# Patient Record
Sex: MALE | Race: OTHER | NOT HISPANIC OR LATINO | URBAN - METROPOLITAN AREA
[De-identification: names, ages, dates, MRNs, and addresses within clinical notes are randomized per-mention and may not be internally consistent; named-entity substitution may affect disease eponyms.]

---

## 2017-02-01 ENCOUNTER — EMERGENCY (EMERGENCY)
Facility: HOSPITAL | Age: 42
LOS: 1 days | Discharge: HOME | End: 2017-02-01
Attending: EMERGENCY MEDICINE
Payer: COMMERCIAL

## 2017-02-01 VITALS
HEART RATE: 72 BPM | TEMPERATURE: 97 F | OXYGEN SATURATION: 98 % | SYSTOLIC BLOOD PRESSURE: 118 MMHG | RESPIRATION RATE: 16 BRPM | DIASTOLIC BLOOD PRESSURE: 65 MMHG

## 2017-02-01 VITALS
TEMPERATURE: 99 F | HEART RATE: 82 BPM | SYSTOLIC BLOOD PRESSURE: 143 MMHG | HEIGHT: 68 IN | RESPIRATION RATE: 18 BRPM | DIASTOLIC BLOOD PRESSURE: 79 MMHG | WEIGHT: 184.97 LBS | OXYGEN SATURATION: 97 %

## 2017-02-01 DIAGNOSIS — R00.1 BRADYCARDIA, UNSPECIFIED: ICD-10-CM

## 2017-02-01 DIAGNOSIS — R23.3 SPONTANEOUS ECCHYMOSES: ICD-10-CM

## 2017-02-01 DIAGNOSIS — T50.901A POISONING BY UNSPECIFIED DRUGS, MEDICAMENTS AND BIOLOGICAL SUBSTANCES, ACCIDENTAL (UNINTENTIONAL), INITIAL ENCOUNTER: ICD-10-CM

## 2017-02-01 LAB
ALBUMIN SERPL ELPH-MCNC: 4 G/DL — SIGNIFICANT CHANGE UP (ref 3.3–5.2)
ALP SERPL-CCNC: 75 U/L — SIGNIFICANT CHANGE UP (ref 40–120)
ALT FLD-CCNC: 19 U/L — SIGNIFICANT CHANGE UP
AMPHET UR-MCNC: NEGATIVE — SIGNIFICANT CHANGE UP
ANION GAP SERPL CALC-SCNC: 11 MMOL/L — SIGNIFICANT CHANGE UP (ref 5–17)
APPEARANCE UR: CLEAR — SIGNIFICANT CHANGE UP
AST SERPL-CCNC: 18 U/L — SIGNIFICANT CHANGE UP
BACTERIA # UR AUTO: ABNORMAL
BARBITURATES UR SCN-MCNC: NEGATIVE — SIGNIFICANT CHANGE UP
BASOPHILS # BLD AUTO: 0 K/UL — SIGNIFICANT CHANGE UP (ref 0–0.2)
BASOPHILS NFR BLD AUTO: 0.2 % — SIGNIFICANT CHANGE UP (ref 0–2)
BENZODIAZ UR-MCNC: POSITIVE
BILIRUB SERPL-MCNC: 0.2 MG/DL — LOW (ref 0.4–2)
BILIRUB UR-MCNC: NEGATIVE — SIGNIFICANT CHANGE UP
BUN SERPL-MCNC: 12 MG/DL — SIGNIFICANT CHANGE UP (ref 8–20)
CALCIUM SERPL-MCNC: 9.1 MG/DL — SIGNIFICANT CHANGE UP (ref 8.6–10.2)
CHLORIDE SERPL-SCNC: 102 MMOL/L — SIGNIFICANT CHANGE UP (ref 98–107)
CO2 SERPL-SCNC: 30 MMOL/L — HIGH (ref 22–29)
COCAINE METAB.OTHER UR-MCNC: POSITIVE
COLOR SPEC: YELLOW — SIGNIFICANT CHANGE UP
CREAT SERPL-MCNC: 0.63 MG/DL — SIGNIFICANT CHANGE UP (ref 0.5–1.3)
DIFF PNL FLD: ABNORMAL
EOSINOPHIL # BLD AUTO: 0.1 K/UL — SIGNIFICANT CHANGE UP (ref 0–0.5)
EOSINOPHIL NFR BLD AUTO: 0.5 % — SIGNIFICANT CHANGE UP (ref 0–5)
EPI CELLS # UR: SIGNIFICANT CHANGE UP
GLUCOSE SERPL-MCNC: 136 MG/DL — HIGH (ref 70–115)
GLUCOSE UR QL: NEGATIVE MG/DL — SIGNIFICANT CHANGE UP
HCT VFR BLD CALC: 44.1 % — SIGNIFICANT CHANGE UP (ref 42–52)
HGB BLD-MCNC: 14.1 G/DL — SIGNIFICANT CHANGE UP (ref 14–18)
KETONES UR-MCNC: NEGATIVE — SIGNIFICANT CHANGE UP
LEUKOCYTE ESTERASE UR-ACNC: NEGATIVE — SIGNIFICANT CHANGE UP
LYMPHOCYTES # BLD AUTO: 1 K/UL — SIGNIFICANT CHANGE UP (ref 1–4.8)
LYMPHOCYTES # BLD AUTO: 8.8 % — LOW (ref 20–55)
MAGNESIUM SERPL-MCNC: 1.9 MG/DL — SIGNIFICANT CHANGE UP (ref 1.8–2.5)
MCHC RBC-ENTMCNC: 27.8 PG — SIGNIFICANT CHANGE UP (ref 27–31)
MCHC RBC-ENTMCNC: 32 G/DL — SIGNIFICANT CHANGE UP (ref 32–36)
MCV RBC AUTO: 86.8 FL — SIGNIFICANT CHANGE UP (ref 80–94)
METHADONE UR-MCNC: NEGATIVE — SIGNIFICANT CHANGE UP
MONOCYTES # BLD AUTO: 0.8 K/UL — SIGNIFICANT CHANGE UP (ref 0–0.8)
MONOCYTES NFR BLD AUTO: 7.8 % — SIGNIFICANT CHANGE UP (ref 3–10)
NEUTROPHILS # BLD AUTO: 9 K/UL — HIGH (ref 1.8–8)
NEUTROPHILS NFR BLD AUTO: 82.3 % — HIGH (ref 37–73)
NITRITE UR-MCNC: NEGATIVE — SIGNIFICANT CHANGE UP
OPIATES UR-MCNC: POSITIVE
PCP SPEC-MCNC: SIGNIFICANT CHANGE UP
PCP UR-MCNC: NEGATIVE — SIGNIFICANT CHANGE UP
PH UR: 6 — SIGNIFICANT CHANGE UP (ref 4.8–8)
PHOSPHATE SERPL-MCNC: 2.4 MG/DL — SIGNIFICANT CHANGE UP (ref 2.4–4.7)
PLATELET # BLD AUTO: 229 K/UL — SIGNIFICANT CHANGE UP (ref 150–400)
POTASSIUM SERPL-MCNC: 4.6 MMOL/L — SIGNIFICANT CHANGE UP (ref 3.5–5.3)
POTASSIUM SERPL-SCNC: 4.6 MMOL/L — SIGNIFICANT CHANGE UP (ref 3.5–5.3)
PROT SERPL-MCNC: 7.4 G/DL — SIGNIFICANT CHANGE UP (ref 6.6–8.7)
PROT UR-MCNC: 30 MG/DL
RBC # BLD: 5.08 M/UL — SIGNIFICANT CHANGE UP (ref 4.6–6.2)
RBC # FLD: 14.5 % — SIGNIFICANT CHANGE UP (ref 11–15.6)
RBC CASTS # UR COMP ASSIST: ABNORMAL /HPF (ref 0–4)
SODIUM SERPL-SCNC: 143 MMOL/L — SIGNIFICANT CHANGE UP (ref 135–145)
SP GR SPEC: 1.02 — SIGNIFICANT CHANGE UP (ref 1.01–1.02)
THC UR QL: POSITIVE
TSH SERPL-MCNC: 0.33 UIU/ML — SIGNIFICANT CHANGE UP (ref 0.27–4.2)
UROBILINOGEN FLD QL: NEGATIVE MG/DL — SIGNIFICANT CHANGE UP
WBC # BLD: 10.94 K/UL — HIGH (ref 4.8–10.8)
WBC # FLD AUTO: 10.94 K/UL — HIGH (ref 4.8–10.8)
WBC UR QL: SIGNIFICANT CHANGE UP

## 2017-02-01 PROCEDURE — 85027 COMPLETE CBC AUTOMATED: CPT

## 2017-02-01 PROCEDURE — 84100 ASSAY OF PHOSPHORUS: CPT

## 2017-02-01 PROCEDURE — 84443 ASSAY THYROID STIM HORMONE: CPT

## 2017-02-01 PROCEDURE — 70450 CT HEAD/BRAIN W/O DYE: CPT

## 2017-02-01 PROCEDURE — 70450 CT HEAD/BRAIN W/O DYE: CPT | Mod: 26

## 2017-02-01 PROCEDURE — 83735 ASSAY OF MAGNESIUM: CPT

## 2017-02-01 PROCEDURE — 99284 EMERGENCY DEPT VISIT MOD MDM: CPT | Mod: 25

## 2017-02-01 PROCEDURE — 93010 ELECTROCARDIOGRAM REPORT: CPT

## 2017-02-01 PROCEDURE — 81001 URINALYSIS AUTO W/SCOPE: CPT

## 2017-02-01 PROCEDURE — 99053 MED SERV 10PM-8AM 24 HR FAC: CPT

## 2017-02-01 PROCEDURE — 80307 DRUG TEST PRSMV CHEM ANLYZR: CPT

## 2017-02-01 PROCEDURE — 93005 ELECTROCARDIOGRAM TRACING: CPT

## 2017-02-01 PROCEDURE — 80053 COMPREHEN METABOLIC PANEL: CPT

## 2017-02-01 NOTE — SBIRT NOTE. - NSSBIRTSERVICES_GEN_A_ED_FT
Provided SBIRT services: Full screen positive. Referral to Treatment Performed. Screening results were reviewed with the patient and patient was provided information about healthy guidelines and potential negative consequences associated with level of risk. Motivation and readiness to reduce or stop use was discussed and goals and activities to make changes were suggested/offered.  Referral for complete assessment and level of care determination at a certified treatment facility was completed by contacting the treatment facility via phone, [facility], waiting for confirmation of a bed.    Audit Score: 2  DAST Score: 9  Duration = 20 Minutes Provided SBIRT services: Full screen positive. Referral to Treatment Performed. Screening results were reviewed with the patient and patient was provided information about healthy guidelines and potential negative consequences associated with level of risk. Motivation and readiness to reduce or stop use was discussed and goals and activities to make changes were suggested/offered.  Referral for complete assessment and level of care determination at a certified treatment facility was completed by contacting the treatment facility via phone, [facility], patient completed full phone intake, confirmed by admission coordinator for same day admission.    Audit Score: 2  DAST Score: 9  Duration = 20 Minutes

## 2017-02-01 NOTE — ED PROVIDER NOTE - PROGRESS NOTE DETAILS
pt evaluated by SBIRT and pt willing to go to detox.  pending bed availability detox bed available at [facility].  will d/c with medical transport to [place]

## 2017-02-01 NOTE — ED ADULT NURSE REASSESSMENT NOTE - NS ED NURSE REASSESS COMMENT FT1
pt more awake. able to answer allquestions. sbirt looking for bed placement for detox. patient agreeable to go to detox.
Patient received sleeping easily arousable. Patient spo2 on room at 92% Dr Juarez aware; 2L oxygen placed via nasal cannula. patient undressed belongings locked. awaiting labs and more awake.

## 2017-02-01 NOTE — ED ADULT TRIAGE NOTE - CHIEF COMPLAINT QUOTE
Pt's mother "heard a thud and went into room and found pt on floor", hx of heroine abuse, 2mg Narcan given IN, pt offers no complaints at this time, no SOB, difficulty breathing, pt awake and alert

## 2017-02-01 NOTE — ED ADULT NURSE NOTE - OBJECTIVE STATEMENT
Pt. BIBA s/p heroin overdose. Pt. was found at home, family called EMS, given narcan, currently resting without complaint, denies, chest pain, SOB, headache. Pt. states he has a history of heroin abuse, was given suboxone but relapsed two months ago, states he OD last month and was brought to . no other complaints at this time. bruise to left orbit from unrelated work accident

## 2017-02-01 NOTE — ED PROVIDER NOTE - OBJECTIVE STATEMENT
40 y/o male in ED c/o OD x 1 day.  pt states used drugs this AM.  pt denies any suicidal or homicidal ideations.  pt states positive previous episodes.  no sick contacts or recent travel.  pt denies any fever, HA, cp,s ob, n/v/d/abd pain.  as per EMS, family found pt unresponsive on floor.

## 2017-04-15 ENCOUNTER — EMERGENCY (EMERGENCY)
Facility: HOSPITAL | Age: 42
LOS: 1 days | Discharge: HOME | End: 2017-04-15
Attending: EMERGENCY MEDICINE
Payer: COMMERCIAL

## 2017-04-15 VITALS
HEART RATE: 74 BPM | HEIGHT: 72 IN | RESPIRATION RATE: 16 BRPM | SYSTOLIC BLOOD PRESSURE: 145 MMHG | DIASTOLIC BLOOD PRESSURE: 85 MMHG | OXYGEN SATURATION: 95 % | TEMPERATURE: 98 F | WEIGHT: 210.1 LBS

## 2017-04-15 VITALS
RESPIRATION RATE: 16 BRPM | DIASTOLIC BLOOD PRESSURE: 55 MMHG | OXYGEN SATURATION: 96 % | SYSTOLIC BLOOD PRESSURE: 107 MMHG | HEART RATE: 76 BPM

## 2017-04-15 DIAGNOSIS — T40.1X1A POISONING BY HEROIN, ACCIDENTAL (UNINTENTIONAL), INITIAL ENCOUNTER: ICD-10-CM

## 2017-04-15 DIAGNOSIS — Z78.9 OTHER SPECIFIED HEALTH STATUS: ICD-10-CM

## 2017-04-15 DIAGNOSIS — Z79.899 OTHER LONG TERM (CURRENT) DRUG THERAPY: ICD-10-CM

## 2017-04-15 DIAGNOSIS — F17.200 NICOTINE DEPENDENCE, UNSPECIFIED, UNCOMPLICATED: ICD-10-CM

## 2017-04-15 LAB
ALBUMIN SERPL ELPH-MCNC: 4.1 G/DL — SIGNIFICANT CHANGE UP (ref 3.3–5.2)
ALP SERPL-CCNC: 78 U/L — SIGNIFICANT CHANGE UP (ref 40–120)
ALT FLD-CCNC: 24 U/L — SIGNIFICANT CHANGE UP
ANION GAP SERPL CALC-SCNC: 12 MMOL/L — SIGNIFICANT CHANGE UP (ref 5–17)
AST SERPL-CCNC: 30 U/L — SIGNIFICANT CHANGE UP
BASOPHILS # BLD AUTO: 0 K/UL — SIGNIFICANT CHANGE UP (ref 0–0.2)
BASOPHILS NFR BLD AUTO: 0.2 % — SIGNIFICANT CHANGE UP (ref 0–2)
BILIRUB SERPL-MCNC: 0.3 MG/DL — LOW (ref 0.4–2)
BUN SERPL-MCNC: 14 MG/DL — SIGNIFICANT CHANGE UP (ref 8–20)
CALCIUM SERPL-MCNC: 9.1 MG/DL — SIGNIFICANT CHANGE UP (ref 8.6–10.2)
CHLORIDE SERPL-SCNC: 98 MMOL/L — SIGNIFICANT CHANGE UP (ref 98–107)
CO2 SERPL-SCNC: 28 MMOL/L — SIGNIFICANT CHANGE UP (ref 22–29)
CREAT SERPL-MCNC: 0.74 MG/DL — SIGNIFICANT CHANGE UP (ref 0.5–1.3)
EOSINOPHIL # BLD AUTO: 0.1 K/UL — SIGNIFICANT CHANGE UP (ref 0–0.5)
EOSINOPHIL NFR BLD AUTO: 0.7 % — SIGNIFICANT CHANGE UP (ref 0–6)
GLUCOSE SERPL-MCNC: 102 MG/DL — SIGNIFICANT CHANGE UP (ref 70–115)
HCT VFR BLD CALC: 41.7 % — LOW (ref 42–52)
HGB BLD-MCNC: 14.1 G/DL — SIGNIFICANT CHANGE UP (ref 14–18)
LYMPHOCYTES # BLD AUTO: 1.2 K/UL — SIGNIFICANT CHANGE UP (ref 1–4.8)
LYMPHOCYTES # BLD AUTO: 12.4 % — LOW (ref 20–55)
MCHC RBC-ENTMCNC: 28.4 PG — SIGNIFICANT CHANGE UP (ref 27–31)
MCHC RBC-ENTMCNC: 33.8 G/DL — SIGNIFICANT CHANGE UP (ref 32–36)
MCV RBC AUTO: 83.9 FL — SIGNIFICANT CHANGE UP (ref 80–94)
MONOCYTES # BLD AUTO: 0.3 K/UL — SIGNIFICANT CHANGE UP (ref 0–0.8)
MONOCYTES NFR BLD AUTO: 3.4 % — SIGNIFICANT CHANGE UP (ref 3–10)
NEUTROPHILS # BLD AUTO: 8 K/UL — SIGNIFICANT CHANGE UP (ref 1.8–8)
NEUTROPHILS NFR BLD AUTO: 82.9 % — HIGH (ref 37–73)
PLATELET # BLD AUTO: 203 K/UL — SIGNIFICANT CHANGE UP (ref 150–400)
POTASSIUM SERPL-MCNC: 4 MMOL/L — SIGNIFICANT CHANGE UP (ref 3.5–5.3)
POTASSIUM SERPL-SCNC: 4 MMOL/L — SIGNIFICANT CHANGE UP (ref 3.5–5.3)
PROT SERPL-MCNC: 7.6 G/DL — SIGNIFICANT CHANGE UP (ref 6.6–8.7)
RBC # BLD: 4.97 M/UL — SIGNIFICANT CHANGE UP (ref 4.6–6.2)
RBC # FLD: 14.8 % — SIGNIFICANT CHANGE UP (ref 11–15.6)
SODIUM SERPL-SCNC: 138 MMOL/L — SIGNIFICANT CHANGE UP (ref 135–145)
WBC # BLD: 9.6 K/UL — SIGNIFICANT CHANGE UP (ref 4.8–10.8)
WBC # FLD AUTO: 9.6 K/UL — SIGNIFICANT CHANGE UP (ref 4.8–10.8)

## 2017-04-15 PROCEDURE — 99284 EMERGENCY DEPT VISIT MOD MDM: CPT | Mod: 25

## 2017-04-15 PROCEDURE — 71010: CPT | Mod: 26

## 2017-04-15 PROCEDURE — 71045 X-RAY EXAM CHEST 1 VIEW: CPT

## 2017-04-15 PROCEDURE — 85027 COMPLETE CBC AUTOMATED: CPT

## 2017-04-15 PROCEDURE — 80053 COMPREHEN METABOLIC PANEL: CPT

## 2017-04-15 PROCEDURE — 94640 AIRWAY INHALATION TREATMENT: CPT

## 2017-04-15 PROCEDURE — 99284 EMERGENCY DEPT VISIT MOD MDM: CPT

## 2017-04-15 RX ORDER — ALBUTEROL 90 UG/1
1 AEROSOL, METERED ORAL EVERY 4 HOURS
Qty: 0 | Refills: 0 | Status: DISCONTINUED | OUTPATIENT
Start: 2017-04-15 | End: 2017-04-19

## 2017-04-15 RX ORDER — SODIUM CHLORIDE 9 MG/ML
1000 INJECTION INTRAMUSCULAR; INTRAVENOUS; SUBCUTANEOUS ONCE
Qty: 0 | Refills: 0 | Status: COMPLETED | OUTPATIENT
Start: 2017-04-15 | End: 2017-04-15

## 2017-04-15 RX ORDER — IPRATROPIUM/ALBUTEROL SULFATE 18-103MCG
3 AEROSOL WITH ADAPTER (GRAM) INHALATION EVERY 6 HOURS
Qty: 0 | Refills: 0 | Status: DISCONTINUED | OUTPATIENT
Start: 2017-04-15 | End: 2017-04-16

## 2017-04-15 RX ORDER — TIOTROPIUM BROMIDE 18 UG/1
1 CAPSULE ORAL; RESPIRATORY (INHALATION) DAILY
Qty: 0 | Refills: 0 | Status: DISCONTINUED | OUTPATIENT
Start: 2017-04-15 | End: 2017-04-19

## 2017-04-15 RX ADMIN — SODIUM CHLORIDE 1000 MILLILITER(S): 9 INJECTION INTRAMUSCULAR; INTRAVENOUS; SUBCUTANEOUS at 18:45

## 2017-04-15 RX ADMIN — Medication 3 MILLILITER(S): at 18:52

## 2017-04-15 NOTE — ED PROVIDER NOTE - PROGRESS NOTE DETAILS
Pt awake and alert.  Observed in ED without any recurrent signs of opiate overdose.  Pt stable for d/c with fiance and given Narcan kit with instruction-she has used kit in the past.  Pt given resource list by Social Work

## 2017-04-15 NOTE — ED BEHAVIORAL HEALTH NOTE - BEHAVIORAL HEALTH NOTE
Social work note: Worker consulted by Dr. aCbezas to meet with pt to discuss community resources.  Worker provided pt and pt's fiance with detox and rehab.  Worker provided emotional support to fiance and resources for emotional support in the community. Worker encourage pt and pt's fiance follow up with resources. MD to provide narcan kit to pt's fiance.

## 2017-04-15 NOTE — ED ADULT NURSE REASSESSMENT NOTE - NS ED NURSE REASSESS COMMENT FT1
Pt's fiance given Narcan kit. She states "I have used this many times before". Pt was educated on use of the Narcan kit, pt repeated back instructions on how to use, and provided with a youtube video to watch.

## 2017-04-15 NOTE — ED ADULT NURSE REASSESSMENT NOTE - NS ED NURSE REASSESS COMMENT FT1
SW @bedside, states sravanthi will call [facility] to try to get him in a program. Will continue to monitor.

## 2017-04-15 NOTE — ED ADULT TRIAGE NOTE - CHIEF COMPLAINT QUOTE
Wife called EMS because patient overdosed on heroin and was unresponsive. Wife performed compressions. As per EMS, wife states she felt ribs crack while doing compressions. Received 4mg intranasal narcan and 0.5mg IV narcan. Pt had pulse with snoring respirations upon EMS arrival. Awoke after IV Narcan. Currently awake and alert. States injected 3 bags of heroin.

## 2017-04-15 NOTE — ED PROVIDER NOTE - CHPI ED SYMPTOMS NEG
no nausea/no abdominal distension/no weakness/no disorientation/no pain/no abdominal pain/no confusion/no vomiting/no fever/no chills

## 2017-04-15 NOTE — ED ADULT NURSE NOTE - OBJECTIVE STATEMENT
Patient found laying in stretcher, awake, alert, and oriented times 3, breathing unlabored.  As per winnie, patient was found on toilet unresponsive and not breathing.  Winnie states she performed CPR and was then become responsive once EMS arrived.  Winnie stated EMS was assisting with respirations.   Patient admits to using 3 bags of heroin.  Patient previously OD in December and February.  Abrasion noted to forehead.  No other complaints at this time.

## 2017-04-15 NOTE — ED PROVIDER NOTE - OBJECTIVE STATEMENT
The patient is a 41 year old male presents to ED after overdose on heroin.  The patient received narcan in the field.  The patient denies any pain currently. No HA, No CP, No SOB, No ABD pain, No motor no sensory loss.

## 2017-04-15 NOTE — ED ADULT NURSE REASSESSMENT NOTE - NS ED NURSE REASSESS COMMENT FT1
Assumed pt care @ 1900 from DESHAUN Henning. Pt is A&Ox3 in NAD on room, NSR on cardiac monitor. Pt denies complaint.  IV clean dry and intact, running without difficulty. Safety maintained, Bed locked in lowest position. Call bell in reach. Pt awaiting SW consult. Will continue to monitor.

## 2017-05-24 ENCOUNTER — EMERGENCY (EMERGENCY)
Facility: HOSPITAL | Age: 42
LOS: 1 days | Discharge: HOME | End: 2017-05-24
Attending: EMERGENCY MEDICINE
Payer: COMMERCIAL

## 2017-05-24 VITALS
TEMPERATURE: 98 F | HEIGHT: 68 IN | RESPIRATION RATE: 18 BRPM | DIASTOLIC BLOOD PRESSURE: 86 MMHG | OXYGEN SATURATION: 92 % | SYSTOLIC BLOOD PRESSURE: 144 MMHG | HEART RATE: 86 BPM | WEIGHT: 212.08 LBS

## 2017-05-24 VITALS
RESPIRATION RATE: 18 BRPM | SYSTOLIC BLOOD PRESSURE: 132 MMHG | DIASTOLIC BLOOD PRESSURE: 78 MMHG | HEART RATE: 74 BPM | OXYGEN SATURATION: 96 % | TEMPERATURE: 98 F

## 2017-05-24 DIAGNOSIS — J45.909 UNSPECIFIED ASTHMA, UNCOMPLICATED: ICD-10-CM

## 2017-05-24 DIAGNOSIS — T40.2X1A POISONING BY OTHER OPIOIDS, ACCIDENTAL (UNINTENTIONAL), INITIAL ENCOUNTER: ICD-10-CM

## 2017-05-24 PROCEDURE — 99283 EMERGENCY DEPT VISIT LOW MDM: CPT | Mod: 25

## 2017-05-24 PROCEDURE — 94640 AIRWAY INHALATION TREATMENT: CPT

## 2017-05-24 PROCEDURE — 71045 X-RAY EXAM CHEST 1 VIEW: CPT

## 2017-05-24 PROCEDURE — 71010: CPT | Mod: 26

## 2017-05-24 PROCEDURE — 99284 EMERGENCY DEPT VISIT MOD MDM: CPT | Mod: 25

## 2017-05-24 RX ORDER — IPRATROPIUM/ALBUTEROL SULFATE 18-103MCG
3 AEROSOL WITH ADAPTER (GRAM) INHALATION ONCE
Qty: 0 | Refills: 0 | Status: COMPLETED | OUTPATIENT
Start: 2017-05-24 | End: 2017-05-24

## 2017-05-24 RX ADMIN — Medication 3 MILLILITER(S): at 01:00

## 2017-05-24 NOTE — ED PROVIDER NOTE - NS ED MD SCRIBE ATTENDING SCRIBE SECTIONS
PHYSICAL EXAM/PAST MEDICAL/SURGICAL/SOCIAL HISTORY/REVIEW OF SYSTEMS/HIV/VITAL SIGNS( Pullset)/HISTORY OF PRESENT ILLNESS/DISPOSITION

## 2017-05-24 NOTE — ED ADULT NURSE NOTE - NS ED NURSE DC INFO COMPLEXITY
Returned Demonstration/Straightforward: Basic instructions, no meds, no home treatment/Patient asked questions/Verbalized Understanding

## 2017-05-24 NOTE — ED PROVIDER NOTE - OBJECTIVE STATEMENT
43 y/o M pt with PMHx of drug abuse BIBA presents to ED c/o heroin overdose. Pt reports he used 2 bags and shot it into his arm. He was given Narcan en route by EMS with relief. Pt denies any physical complaints, SI/HI, CP, SOB, fever, headache, nausea, vomiting, abdominal pain, and back pain. No further complaints at this time. NKDA.

## 2017-05-24 NOTE — ED ADULT TRIAGE NOTE - CHIEF COMPLAINT QUOTE
Pt was heroin overdose, EMS called by family, pt rec'd 1mg IN Narcan and 2mg IVP Narcan prior to arrival, pt A&O x's 3, denies pain, offers no complaints at this time

## 2017-05-24 NOTE — ED ADULT NURSE NOTE - OBJECTIVE STATEMENT
Pt received sitting on stretcher in NAD. Pt AOx3 C/o overdosing on Heroin.  Neuro WNL. PERRLA. Lungs sounds wheeze Bl, RR even unlabored. Ab soft non tender, + bowel sounds x 4quads. Denies Nausea, Vomiting, Diarrhea. Skin warm, dry, color appropriate for age and race. Pt placed on monitor SPO2 92 pt given a breathing treatment as ordered, + relief. Lungs CTA.

## 2018-02-01 ENCOUNTER — OUTPATIENT (OUTPATIENT)
Dept: OUTPATIENT SERVICES | Facility: HOSPITAL | Age: 43
LOS: 1 days | End: 2018-02-01
Payer: MEDICAID

## 2018-02-04 ENCOUNTER — EMERGENCY (EMERGENCY)
Facility: HOSPITAL | Age: 43
LOS: 1 days | Discharge: HOME | End: 2018-02-04
Attending: EMERGENCY MEDICINE | Admitting: EMERGENCY MEDICINE
Payer: MEDICAID

## 2018-02-04 VITALS
OXYGEN SATURATION: 89 % | WEIGHT: 210.1 LBS | HEIGHT: 68 IN | DIASTOLIC BLOOD PRESSURE: 73 MMHG | TEMPERATURE: 98 F | HEART RATE: 82 BPM | SYSTOLIC BLOOD PRESSURE: 114 MMHG | RESPIRATION RATE: 18 BRPM

## 2018-02-04 VITALS
SYSTOLIC BLOOD PRESSURE: 123 MMHG | DIASTOLIC BLOOD PRESSURE: 72 MMHG | OXYGEN SATURATION: 95 % | RESPIRATION RATE: 18 BRPM | HEART RATE: 80 BPM

## 2018-02-04 PROCEDURE — 99284 EMERGENCY DEPT VISIT MOD MDM: CPT | Mod: 25

## 2018-02-04 PROCEDURE — 94640 AIRWAY INHALATION TREATMENT: CPT

## 2018-02-04 PROCEDURE — 71046 X-RAY EXAM CHEST 2 VIEWS: CPT | Mod: 26

## 2018-02-04 PROCEDURE — 99285 EMERGENCY DEPT VISIT HI MDM: CPT | Mod: 25

## 2018-02-04 PROCEDURE — 71046 X-RAY EXAM CHEST 2 VIEWS: CPT

## 2018-02-04 RX ORDER — NALOXONE HYDROCHLORIDE 4 MG/.1ML
4 SPRAY NASAL
Qty: 2 | Refills: 0 | OUTPATIENT
Start: 2018-02-04

## 2018-02-04 RX ORDER — IPRATROPIUM/ALBUTEROL SULFATE 18-103MCG
3 AEROSOL WITH ADAPTER (GRAM) INHALATION ONCE
Qty: 0 | Refills: 0 | Status: COMPLETED | OUTPATIENT
Start: 2018-02-04 | End: 2018-02-04

## 2018-02-04 RX ADMIN — Medication 3 MILLILITER(S): at 08:21

## 2018-02-04 NOTE — ED PROVIDER NOTE - OBJECTIVE STATEMENT
42 year old male with PMH opiate abuse presents with overdose. pt states that for the past 2 days he relapsed. He used 3 bags of heroin and then was found unresponsive by girlfriend on the toilet. He was given narcan by police, woke up, and states he feels well currently. C/o wheezing, but no chest pain, SOB, abd pain, nausea, vomiting.

## 2018-02-04 NOTE — ED PROVIDER NOTE - MEDICAL DECISION MAKING DETAILS
Pt awake, alert, oriented. No somnolence, tolerated PO, ambulated, no hypoxia, well appearing. Pt is going home where he lives with fiance and will not be alone. Pt states he will go to [facility] on way home and  narcan.

## 2018-02-04 NOTE — ED ADULT NURSE NOTE - OBJECTIVE STATEMENT
Patient states he overdosed today, states injected heroin about 2-3 bags,was found by girlfriend on the toilet.  States recently relapsed. Awake, alert, orientedx3 at this time, denies any pain or injury.

## 2018-02-04 NOTE — ED ADULT TRIAGE NOTE - CHIEF COMPLAINT QUOTE
Pt with hx of drug abuse injected 3 bags of heroin today. Pt was found by girlfriend unresponsive sitting on the toilet this AM. Pt was given Narcan 2mg IN by [employer] on scene with + response. Pt arrives to ED A&Ox3 and voices no complaints. FS by . Spo2 89% on RA, oxygen applied via nasal cannula.

## 2018-02-08 DIAGNOSIS — R69 ILLNESS, UNSPECIFIED: ICD-10-CM

## 2018-05-01 PROCEDURE — G9001: CPT

## 2020-02-03 ENCOUNTER — APPOINTMENT (OUTPATIENT)
Dept: GASTROENTEROLOGY | Facility: CLINIC | Age: 45
End: 2020-02-03

## 2020-02-04 PROBLEM — Z00.00 ENCOUNTER FOR PREVENTIVE HEALTH EXAMINATION: Status: ACTIVE | Noted: 2020-02-04

## 2020-02-06 ENCOUNTER — APPOINTMENT (OUTPATIENT)
Dept: GASTROENTEROLOGY | Facility: CLINIC | Age: 45
End: 2020-02-06
Payer: MEDICAID

## 2020-02-06 VITALS — HEIGHT: 68 IN | BODY MASS INDEX: 36.83 KG/M2 | WEIGHT: 243 LBS

## 2020-02-06 DIAGNOSIS — B18.2 CHRONIC VIRAL HEPATITIS C: ICD-10-CM

## 2020-02-06 PROCEDURE — 99203 OFFICE O/P NEW LOW 30 MIN: CPT

## 2020-02-06 RX ORDER — AMOXICILLIN AND CLAVULANATE POTASSIUM 875; 125 MG/1; MG/1
875-125 TABLET, COATED ORAL
Qty: 20 | Refills: 0 | Status: DISCONTINUED | COMMUNITY
Start: 2020-01-25

## 2020-02-06 RX ORDER — METHYLPREDNISOLONE 4 MG/1
4 TABLET ORAL
Qty: 21 | Refills: 0 | Status: DISCONTINUED | COMMUNITY
Start: 2020-01-25

## 2020-02-06 RX ORDER — FLUTICASONE PROPIONATE 50 UG/1
50 SPRAY, METERED NASAL
Qty: 16 | Refills: 0 | Status: DISCONTINUED | COMMUNITY
Start: 2020-01-25

## 2020-02-06 NOTE — HISTORY OF PRESENT ILLNESS
[de-identified] : Mr. TANIYA WORLEY is a 44 year old male with history of hepatitis C that was detected on routine laboratory tests. Patient has a history of intravenous drug use in the past. He reports that he tested negative in the past. Patient has no complaints of abdominal pain joint pains.\par

## 2020-02-06 NOTE — ASSESSMENT
[FreeTextEntry1] : 43 yo male with history of hepatitis C. Obtain genotype, sono. Follow up in two weeks and consider therapy at that time.

## 2020-02-06 NOTE — PHYSICAL EXAM
[General Appearance - In No Acute Distress] : in no acute distress [General Appearance - Alert] : alert [Auscultation Breath Sounds / Voice Sounds] : lungs were clear to auscultation bilaterally [Heart Rate And Rhythm] : heart rate was normal and rhythm regular [Heart Sounds] : normal S1 and S2 [Heart Sounds Gallop] : no gallops [Murmurs] : no murmurs [Heart Sounds Pericardial Friction Rub] : no pericardial rub [Bowel Sounds] : normal bowel sounds [Abdomen Soft] : soft [Abdomen Tenderness] : non-tender [] : no hepato-splenomegaly [Abdomen Mass (___ Cm)] : no abdominal mass palpated

## 2020-02-25 ENCOUNTER — OUTPATIENT (OUTPATIENT)
Dept: OUTPATIENT SERVICES | Facility: HOSPITAL | Age: 45
LOS: 1 days | End: 2020-02-25

## 2020-02-25 DIAGNOSIS — B18.2 CHRONIC VIRAL HEPATITIS C: ICD-10-CM

## 2020-02-28 ENCOUNTER — OUTPATIENT (OUTPATIENT)
Dept: OUTPATIENT SERVICES | Facility: HOSPITAL | Age: 45
LOS: 1 days | End: 2020-02-28

## 2020-02-28 ENCOUNTER — APPOINTMENT (OUTPATIENT)
Dept: ULTRASOUND IMAGING | Facility: CLINIC | Age: 45
End: 2020-02-28
Payer: MEDICAID

## 2020-02-28 DIAGNOSIS — B18.2 CHRONIC VIRAL HEPATITIS C: ICD-10-CM

## 2020-02-28 PROCEDURE — 76700 US EXAM ABDOM COMPLETE: CPT | Mod: 26

## 2020-09-05 ENCOUNTER — EMERGENCY (EMERGENCY)
Facility: HOSPITAL | Age: 45
LOS: 1 days | Discharge: HOME | End: 2020-09-05
Attending: EMERGENCY MEDICINE
Payer: MEDICAID

## 2020-09-05 VITALS
HEART RATE: 106 BPM | SYSTOLIC BLOOD PRESSURE: 152 MMHG | HEIGHT: 68 IN | DIASTOLIC BLOOD PRESSURE: 92 MMHG | OXYGEN SATURATION: 89 % | WEIGHT: 248.02 LBS | TEMPERATURE: 98 F | RESPIRATION RATE: 14 BRPM

## 2020-09-05 VITALS
DIASTOLIC BLOOD PRESSURE: 89 MMHG | HEART RATE: 84 BPM | RESPIRATION RATE: 16 BRPM | SYSTOLIC BLOOD PRESSURE: 148 MMHG | OXYGEN SATURATION: 94 %

## 2020-09-05 LAB
ALBUMIN SERPL ELPH-MCNC: 4.6 G/DL — SIGNIFICANT CHANGE UP (ref 3.3–5.2)
ALP SERPL-CCNC: 80 U/L — SIGNIFICANT CHANGE UP (ref 40–120)
ALT FLD-CCNC: 26 U/L — SIGNIFICANT CHANGE UP
ANION GAP SERPL CALC-SCNC: 13 MMOL/L — SIGNIFICANT CHANGE UP (ref 5–17)
APTT BLD: 30.3 SEC — SIGNIFICANT CHANGE UP (ref 27.5–35.5)
AST SERPL-CCNC: 46 U/L — HIGH
BASOPHILS # BLD AUTO: 0.08 K/UL — SIGNIFICANT CHANGE UP (ref 0–0.2)
BASOPHILS NFR BLD AUTO: 0.5 % — SIGNIFICANT CHANGE UP (ref 0–2)
BILIRUB SERPL-MCNC: 0.2 MG/DL — LOW (ref 0.4–2)
BUN SERPL-MCNC: 13 MG/DL — SIGNIFICANT CHANGE UP (ref 8–20)
CALCIUM SERPL-MCNC: 9.7 MG/DL — SIGNIFICANT CHANGE UP (ref 8.6–10.2)
CHLORIDE SERPL-SCNC: 95 MMOL/L — LOW (ref 98–107)
CO2 SERPL-SCNC: 27 MMOL/L — SIGNIFICANT CHANGE UP (ref 22–29)
CREAT SERPL-MCNC: 0.87 MG/DL — SIGNIFICANT CHANGE UP (ref 0.5–1.3)
EOSINOPHIL # BLD AUTO: 0.04 K/UL — SIGNIFICANT CHANGE UP (ref 0–0.5)
EOSINOPHIL NFR BLD AUTO: 0.2 % — SIGNIFICANT CHANGE UP (ref 0–6)
GLUCOSE SERPL-MCNC: 157 MG/DL — HIGH (ref 70–99)
HCT VFR BLD CALC: 48.9 % — SIGNIFICANT CHANGE UP (ref 39–50)
HGB BLD-MCNC: 15.4 G/DL — SIGNIFICANT CHANGE UP (ref 13–17)
IMM GRANULOCYTES NFR BLD AUTO: 1.4 % — SIGNIFICANT CHANGE UP (ref 0–1.5)
INR BLD: 1.02 RATIO — SIGNIFICANT CHANGE UP (ref 0.88–1.16)
LYMPHOCYTES # BLD AUTO: 18.2 % — SIGNIFICANT CHANGE UP (ref 13–44)
LYMPHOCYTES # BLD AUTO: 3.06 K/UL — SIGNIFICANT CHANGE UP (ref 1–3.3)
MCHC RBC-ENTMCNC: 28.3 PG — SIGNIFICANT CHANGE UP (ref 27–34)
MCHC RBC-ENTMCNC: 31.5 GM/DL — LOW (ref 32–36)
MCV RBC AUTO: 89.9 FL — SIGNIFICANT CHANGE UP (ref 80–100)
MONOCYTES # BLD AUTO: 1.26 K/UL — HIGH (ref 0–0.9)
MONOCYTES NFR BLD AUTO: 7.5 % — SIGNIFICANT CHANGE UP (ref 2–14)
NEUTROPHILS # BLD AUTO: 12.13 K/UL — HIGH (ref 1.8–7.4)
NEUTROPHILS NFR BLD AUTO: 72.2 % — SIGNIFICANT CHANGE UP (ref 43–77)
PLATELET # BLD AUTO: 243 K/UL — SIGNIFICANT CHANGE UP (ref 150–400)
POTASSIUM SERPL-MCNC: 4.3 MMOL/L — SIGNIFICANT CHANGE UP (ref 3.5–5.3)
POTASSIUM SERPL-SCNC: 4.3 MMOL/L — SIGNIFICANT CHANGE UP (ref 3.5–5.3)
PROT SERPL-MCNC: 8.2 G/DL — SIGNIFICANT CHANGE UP (ref 6.6–8.7)
PROTHROM AB SERPL-ACNC: 11.8 SEC — SIGNIFICANT CHANGE UP (ref 10.6–13.6)
RBC # BLD: 5.44 M/UL — SIGNIFICANT CHANGE UP (ref 4.2–5.8)
RBC # FLD: 14 % — SIGNIFICANT CHANGE UP (ref 10.3–14.5)
SODIUM SERPL-SCNC: 135 MMOL/L — SIGNIFICANT CHANGE UP (ref 135–145)
TROPONIN T SERPL-MCNC: <0.01 NG/ML — SIGNIFICANT CHANGE UP (ref 0–0.06)
WBC # BLD: 16.8 K/UL — HIGH (ref 3.8–10.5)
WBC # FLD AUTO: 16.8 K/UL — HIGH (ref 3.8–10.5)

## 2020-09-05 PROCEDURE — 84484 ASSAY OF TROPONIN QUANT: CPT

## 2020-09-05 PROCEDURE — 99285 EMERGENCY DEPT VISIT HI MDM: CPT | Mod: 25

## 2020-09-05 PROCEDURE — 85610 PROTHROMBIN TIME: CPT

## 2020-09-05 PROCEDURE — 85027 COMPLETE CBC AUTOMATED: CPT

## 2020-09-05 PROCEDURE — 71045 X-RAY EXAM CHEST 1 VIEW: CPT

## 2020-09-05 PROCEDURE — 99285 EMERGENCY DEPT VISIT HI MDM: CPT

## 2020-09-05 PROCEDURE — 85730 THROMBOPLASTIN TIME PARTIAL: CPT

## 2020-09-05 PROCEDURE — 80053 COMPREHEN METABOLIC PANEL: CPT

## 2020-09-05 PROCEDURE — 36415 COLL VENOUS BLD VENIPUNCTURE: CPT

## 2020-09-05 PROCEDURE — 93010 ELECTROCARDIOGRAM REPORT: CPT

## 2020-09-05 PROCEDURE — 93005 ELECTROCARDIOGRAM TRACING: CPT

## 2020-09-05 PROCEDURE — 71045 X-RAY EXAM CHEST 1 VIEW: CPT | Mod: 26

## 2020-09-05 PROCEDURE — 82962 GLUCOSE BLOOD TEST: CPT

## 2020-09-05 NOTE — ED ADULT TRIAGE NOTE - CHIEF COMPLAINT QUOTE
Patient A&Ox4 complaining of feeling sleepy. Stated took 2 muscle relaxers for his chronic back pain, ran out of his regular pain meds. Stated does not know name of medication he took. EMS reports patient found unconscious/unresponsive  & diaphoretic at his house. Respirations even & unlabored. SpO2 89% in triage, placed on O2 at 3LPM, SpO2 increased to 95%.

## 2020-09-05 NOTE — ED PROVIDER NOTE - NSFOLLOWUPINSTRUCTIONS_ED_ALL_ED_FT
Accidental Overdose    An accidental overdose happens when a person accidentally takes too much of a substance, such as a prescription medicine, an illegal drug, or an over-the-counter medicine. The effects of an overdose can be mild, dangerous, or even deadly.     What are the causes?  This condition is caused by taking too much of a medicine or other substance. It often results from:    Lack of knowledge about a substance.  Using more than one substance at the same time.  An error made by the health care provider who prescribed the substance.  An error made by the pharmacist who fills the prescription order.  A lapse in memory, such as forgetting that you have already taken a dose of medicine.  Suddenly using a substance after a long period of not using it.    Substances that can cause an accidental overdose include:    Alcohol.  Medicines that treat mental problems (psychotropic medicines).  Pain medicines.  Cocaine.  Heroin.  Multivitamins that contain iron.    What increases the risk?  This condition is more likely to occur in:    Children. Children are at increased risk for an overdose even if they are given only a small amount of a substance because of their small size. Children may also be attracted to colorful pills.  Elderly adults. Elderly adults are more likely to overdose because they may be taking many different medicines. They may also have difficulty reading labels or remembering when they last took their medicine.  People who use illegal drugs.  People who drink alcohol while using drugs or certain medicines.  People with certain mental health conditions.    What are the signs or symptoms?  Symptoms of this condition depend on the substance and the amount that was taken. Common symptoms include:    Behavior changes, such as confusion.  Sleepiness.  Weakness.  Slowed breathing.  Nausea and vomiting.  Seizures.  Very large or small eye pupil size.    An overdose can cause a very serious condition in which your blood pressure drops to a low level (shock). Symptoms of shock include:    Cold and clammy skin.  Pale skin.  Blue lips.  Very slow breathing.  Extreme sleepiness.  Severe confusion.    How is this diagnosed?  This condition is diagnosed based on your symptoms and a physical exam. You will be asked to tell your health care provider which substances you took and when you took them. During the physical exam your health care provider may check and monitor your heart rate and rhythm, your temperature, and your blood pressure (vital signs). He or she may also check your breathing and oxygen levels. Sometimes tests are also done to diagnose the condition. Tests may include:    Urine tests. These are done to check for substances in your system.  Blood tests. These may be done to check for:  Substances in your system.  Signs of an imbalance in your blood minerals (electrolytes).  Liver damage.  Kidney damage.  An abnormal acid level in your blood.  An electrocardiogram (ECG). This tests is done to monitor electrical activity in your heart.    How is this treated?  This condition may need to be treated right away at the hospital. The first step in treatment is supporting your vital signs and your breathing. After that treatment may involve:    Getting fluids and electrolytes through an IV tube.  Having a breathing tube inserted in your airway to help you breathe. The breathing tube is called a endotracheal tube.  Getting medicines. These may include:  Medicines that absorb any substance that is in your digestive system.  Medicines that block or reverse the effect of the substance that caused the overdose.  Having your blood filtered through an artificial kidney machine (hemodialysis).  Ongoing counseling and mental health support. This may be provided if you used an illegal drug.    Follow these instructions at home:  Medicines    Take over-the-counter and prescription medicines only as told by your health care provider.  Before taking a new medicine, ask your health care provider whether the medicine may cause side effects and whether the medicine might react with other medicines.  Keep a list of all of the medicines that you take, including over-the-counter medicines. Bring this list with you to all of your medical visits.    General instructions    Drink enough fluid to keep your urine clear or pale yellow.  If you are working with a counselor or mental health professional, make sure to follow his or her instructions.  Keep all follow-up visits as told by your health care provider. This is important.  Limit alcohol intake to no more than 1 drink a day for nonpregnant women and 2 drinks a day for men. One drink equals 12 oz of beer, 5 oz of wine, or 1½ oz of hard liquor.    How is this prevented?  Get help if you are struggling with:  Alcohol or drug use.  Depression or another mental health problem.  Keep the phone number of your local poison control center near your phone or on your cell phone. The hotline of the National Poison Control Center is (364) 153-8390.  Store all medicines in safety containers that are out of the reach of children.  Read the drug inserts that come with your medicines.  Create a system for taking your medicine, such as with a pill box, that will help you avoid taking too much.  Do not drink alcohol while taking medicines unless your health care provider approves.  Do not use illegal drugs.  Do not take medicines that are not prescribed for you.  If you are breastfeeding, talk to your health care provider before taking medicines or other substances. Certain drugs and medicines can be passed through breast milk to your baby.    Contact a health care provider if:  Your symptoms return.  You develop new symptoms or side effects after taking a medicine.  You have questions about a possible overdose. Call your local poison control center at 1-940.110.5316.    Get help right away if:  You think that you or someone else may have taken too much of a substance.  You or someone else is having symptoms of an overdose.  You have serious thoughts about hurting yourself or others.  You become confused.  You have chest pain.  You have trouble breathing.  You lose consciousness.  You have a seizure.  You have trouble staying awake.    These symptoms may represent a serious problem that is an emergency. Do not wait to see if the symptoms will go away. Get medical help right away. Call your local emergency services (911 in the [place] Do not drive yourself to the hospital.    Summary  An accidental overdose happens when a person accidentally takes too much of a medicine or other substance, such as a prescription medicine, an illegal drug, or an over-the-counter medicine.  The effects of an overdose can be mild, dangerous, or even deadly.  This condition is diagnosed based on your symptoms and a physical exam. You will be asked to tell your health care provider which substances you took and when you took them.  This condition may need to be treated right away at the hospital.    ADDITIONAL NOTES AND INSTRUCTIONS    Please follow up with your Primary MD in 24-48 hr.  Seek immediate medical care for any new/worsening signs or symptoms.

## 2020-09-05 NOTE — ED PROVIDER NOTE - ATTENDING CONTRIBUTION TO CARE
Has a history of polysubstance abuse, tonight reportedly took an extra tramadol and was found unresponsive. He was easily aroused, did not require any naloxone reversal. Initially was hypoxic which corrected without intervention. CXR shows no evidence of pulmonary edema or aspiration. Upon dc pt awake, alert, fully back to baseline with SpO2 >97 on RA

## 2020-09-05 NOTE — ED PROVIDER NOTE - NS ED ROS FT
Constitutional: no fever, sweats, and no chills.  Eyes: no pain, no redness, and no visual changes.  ENMT: no ear pain and no hearing problems, no nasal congestion/drainage, no dysphagia, and no throat pain, no neck pain, no stiffness  CV: no chest pain, no edema.  Resp: no cough, no dyspnea  GI: no abdominal pain, no bloating, no constipation, no diarrhea, no nausea and no vomiting.  : no dysuria, no hematuria  MSK: no msk pain, no weakness  Skin: no lesions, and no rashes.  Neuro: no LOC, no headache, no sensory deficits, and no weakness.

## 2020-09-05 NOTE — ED ADULT NURSE REASSESSMENT NOTE - NS ED NURSE REASSESS COMMENT FT1
Pt. awake and alert. Pt. ambulating with steady gait. Pt. respriations even and unlabore.d Pt. able to maintain O2 sat above 92% without oxygen. Pt. given back clothing for DC.

## 2020-09-05 NOTE — ED PROVIDER NOTE - CLINICAL SUMMARY MEDICAL DECISION MAKING FREE TEXT BOX
Pt is a 45 y.o. M presenting with tramadol overdose. Hypoxic to 86%, stable on 3L NC O2. Hemodynamically stable, mentating well. Labs, meds, imaging.

## 2020-09-05 NOTE — ED PROVIDER NOTE - PHYSICAL EXAMINATION
General: well appearing, NAD, diaphoretic  Head:  NC, AT  Eyes: EOMI, pupils 4 mm and reactive, PERRLA, no scleral icterus  Ears: no erythema/drainage  Nose: midline, no bleeding/drainage  Throat: MMM  Cardiac: RRR, no m/r/g, no lower extremity edema  Respiratory: CTABL, no wheezes/rales/rhonchi, equal chest wall expansions  Abdomen: soft, ND, NT, no rebound tenderness, no guarding, nonperitonitic  MSK/Vascular: full ROM, distal pulses intact, soft compartments, warm extremities  Neuro: AAOx3, negative pronator drift, strength 5/5, sensation to light touch intact, finger to nose coordination intact, cranial nerves 2-12 intact  Psych: calm, cooperative, normal affect

## 2020-09-05 NOTE — ED PROVIDER NOTE - PATIENT PORTAL LINK FT
You can access the FollowMyHealth Patient Portal offered by [facility] by registering at the following website: [email or web address]. By joining [employer]’s FollowMyHealth portal, you will also be able to view your health information using other applications (apps) compatible with our system.

## 2020-09-05 NOTE — ED PROVIDER NOTE - OBJECTIVE STATEMENT
Pt is a 45 y.o. M hx of Hep C, cocaine abuse, IVDU, chronic back and knee pain presenting with fatigue after drug ingestion. The pt states he was took two tramadol today for back pain, when he typically only takes one and felt very lethargic and fatigued. BIBEMS, noted to be 86% O2 sat on RA, placed on supplemental O2. Mentating well and answering questions. Denies any sweats, chills, fever, chest pain, shortness of breath, abdominal pain, n/v/d/c.

## 2020-09-05 NOTE — ED ADULT NURSE NOTE - OBJECTIVE STATEMENT
Assumed pt. care at 1950. Pt. clothing removed, placed in BH and pt. placed in yellow gown.  Pt. A&OX3. Pt. is sleepy, but wakes up to voice. Pt. on cardiac monitor and continuos pulse ox. Pt. on 3LNC and satting above 95%.  Pt. in NSR in lead II. Pt. respirations even and unlabored. Pt. denies any complaints. Pt. states he took "two muscle relaxers" that he doesn't know the name of because he ran out of his pain medication. Pt. admits to drug using methadone (last used 4 days ago) and taking "a quater of xanax" this morning. Pt. explained plan of care to monitor him and his O2 sat. as per EMS pt room air sat at home is 89%. Pt. denies trying to hurt himself. Denies SI/HI

## 2020-09-05 NOTE — ED PROVIDER NOTE - PROGRESS NOTE DETAILS
Jeff Shell, Resident: Pt feeling better, no longer fatigued/lethargic. No longer on oxygen, saturation 96%, RR within normal limits, no increased work of breathing. Mentating well, AAOx3, S1/S2 noted, no m/r/g, lungs CTABL, abdomen soft/ND/NT. Advised to cease illicit/recreational drug use and to not overdose on prescribed medication. Pt declines any interventions for drug use, advised to follow up with PMD and given discharge precautions.

## 2021-11-10 NOTE — ED ADULT TRIAGE NOTE - BP NONINVASIVE DIASTOLIC (MM HG)
Pranav Strauss is a 77 y.o. female who presents today   Chief Complaint   Patient presents with    New Patient     I am a new pt being seen for Recurrent UTI's. Patient is a 48-year-old female presents clinic today with complaints of recurrent UTI. These been going on for several years now. When she does have a UTI she has symptoms of lower back pain, suprapubic pain, dysuria, increased frequency, fever, chills. Today she denies any symptoms at this time. Previous urine cultures are below  Previous Urine Cultures:  10/22/21 No growth  9/14/21 Enterococcus faecalis, E coli   8/17/21 No growth   7/30/21 E coli pansen  6/03/21 E coli pansen  9/3/2020 E coli     She does have frequency approximately every 2 hours throughout the day but she does drink a lot. She is also currently maintained on cranberry supplements twice a day. She denies any urge incontinence but does have incontinence on coughing, sneezing, laughing. She wears approximately 1-2 pads per day and does not change to these with each dribble. Denies any personal history of breast, cervical, uterine, ovarian cancer. Did have a mammogram approximately 1 year ago which was negative. Bladder scan today revealed 15 mL PVR.       Past Medical History:   Diagnosis Date    Alopecia     Anxiety 9/30/2019    Bilateral sensorineural hearing loss 11/16/2018    Coronary artery disease involving native coronary artery of native heart without angina pectoris 6/18/2020    Edema     Graves disease     Headache disorder 10/25/2018    Hypertension     Hyperthyroidism     Graves    Hypothyroidism     Kidney stone     hx. of with eswl    Murmur     Osteoarthritis     Other emphysema ([address] 9/30/2019    Shoulder pain     lt       Past Surgical History:   Procedure Laterality Date    CATARACT REMOVAL WITH IMPLANT Bilateral 11/2017    Estimate on date    EYE SURGERY      cataracts 10/16/17(Left) -9/10/17 (right)    HYSTERECTOMY      INNER EAR SURGERY Left     JOINT REPLACEMENT      LITHOTRIPSY      MI RECONSTR TOTAL SHOULDER IMPLANT Left 10/26/2017    SHOULDER TOTAL ARTHROPLASTY REVERSE performed by Luci Alvarez MD at [facility] Left 10/21/2021    LEFT TOTAL KNEE ARTHROPLASTY performed by Nick Alvarez MD at [facility] OR       Current Outpatient Medications   Medication Sig Dispense Refill    [START ON 11/12/2021] estradiol (ESTRACE VAGINAL) 0.1 MG/GM vaginal cream Place 1 g vaginally three times a week 1 each 3    levothyroxine (SYNTHROID) 125 MCG tablet Take 1 tablet by mouth nightly 90 tablet 3    metoprolol succinate (TOPROL XL) 25 MG extended release tablet       oxyCODONE (ROXICODONE) 5 MG immediate release tablet       colchicine (COLCRYS) 0.6 MG tablet Take 1 tablet by mouth 2 times daily 10 tablet 1    allopurinol (ZYLOPRIM) 100 MG tablet Take 1 tablet by mouth daily 30 tablet 5    diphenhydrAMINE (BENADRYL) 25 MG tablet Take 1 tablet by mouth nightly as needed for Itching or Sleep 20 tablet 0    aspirin EC 81 MG EC tablet Take 1 tablet by mouth 2 times daily 60 tablet 0    Multiple Vitamins-Minerals (CENTRUM SILVER 50+WOMEN PO) Take 1 tablet by mouth daily      Calcium Citrate-Vitamin D (CITRACAL + D PO) Take 1 tablet by mouth daily      amLODIPine (NORVASC) 5 MG tablet Take 5 mg by mouth daily      hydrALAZINE (APRESOLINE) 10 MG tablet TAKE 1 TABLET THREE TIMES DAILY (Patient taking differently: Take 10 mg by mouth 3 times daily ) 270 tablet 1    zoster recombinant adjuvanted vaccine (SHINGRIX) 50 MCG/0.5ML SUSR injection Inject 0.5 mLs into the muscle See Admin Instructions 1 dose now and repeat in 2-6 months 0.5 mL 1    valsartan (DIOVAN) 320 MG tablet TAKE 1 TABLET EVERY DAY (Patient taking differently: Take 320 mg by mouth daily TAKE 1 TABLET EVERY DAY) 90 tablet 3    furosemide (LASIX) 40 MG tablet TAKE 1 TABLET EVERY DAY (Patient taking differently: Take 40 mg by mouth daily ) 90 tablet 3    Naproxen Sodium (ALEVE PO) Take 440 mg by mouth daily       cloNIDine (CATAPRES) 0.1 MG tablet TID PRN for bp .170/110 90 tablet 3    Biotin 5000 MCG TABS Take 5,000 mcg by mouth daily        No current facility-administered medications for this visit. Allergies   Allergen Reactions    Biaxin [Clarithromycin] Nausea And Vomiting       Social History     Socioeconomic History    Marital status:      Spouse name: None    Number of children: None    Years of education: None    Highest education level: None   Occupational History    None   Tobacco Use    Smoking status: Former Smoker     Packs/day: 1.00     Years: 23.00     Pack years: 23.00     Types: Cigarettes     Start date: 1973     Quit date: 1996     Years since quittin.3    Smokeless tobacco: Never Used   Vaping Use    Vaping Use: Never used   Substance and Sexual Activity    Alcohol use: No     Comment: \"twice a yearly\"    Drug use: No    Sexual activity: Yes     Partners: Male   Other Topics Concern    None   Social History Narrative     21 years second marriage    She has 2 daughters    Worked as a hairdresser for 5 years and as a  at the [employer] for about 9 years    Education high school    Enjoys playing [employer]    She does drive an automobile    Walks unassisted    Smoked 1 pack/day for 15 years quit 20 years ago denies alcohol or substance usage     Social Determinants of Health     Financial Resource Strain: Medium Risk    Difficulty of Paying Living Expenses: Somewhat hard   Food Insecurity: No Food Insecurity    Worried About Running Out of Food in the Last Year: Never true    Juni of Food in the Last Year: Never true   Transportation Needs: No Transportation Needs    Lack of Transportation (Medical): No    Lack of Transportation (Non-Medical):  No   Physical Activity:     Days of Exercise per Week: Not on file    Minutes of Exercise per Session: tenderness or left CVA tenderness. Neurological:      Mental Status: She is alert and oriented to person, place, and time. Mental status is at baseline. Motor: Weakness present. Gait: Gait abnormal.   Psychiatric:         Mood and Affect: Mood normal.         Behavior: Behavior normal.       DATA:    Results for orders placed or performed in visit on 11/11/21   POCT Urinalysis Dipstick w/ Micro (Auto)   Result Value Ref Range    Color, UA Yellow     Clarity, UA Clear Clear    Glucose, Ur neg     Bilirubin Urine 0 mg/dL    Ketones, Urine Negative     Specific Gravity, UA 1.025 1.005 - 1.030    Blood, Urine Negative     pH, UA 6.0 4.5 - 8.0    Protein, UA Positive (A) Negative    Nitrite, Urine Negative     Leukocytes, UA small     Urobilinogen, Urine Normal     rbc urine, poc 2     wbc urine, poc 8     bacteria urine, poc 3+     yeast urine, poc 0     casts urine, poc 0     Epi Cells Urine, POC 0     crystals urine, poc 0      No results found for: PSA  No results found for: PSAFREEPCT         1. Recurrent UTI  Patient has had several recurrent UTIs over the last year. She is likely colonized since she is incontinent at times. Encouraged her to change her pads with every incontinent episode. We will also go ahead and start vaginal estrogen 3 times a week. Discussed use, benefit, and side effects of prescribed medications. All questions answered. Patient voiced understanding and agreed with treatment plan. I really do not think daily antibiotics is an option for her since she will likely have resistance since she is incontinent and likely colonized. I would not treat her unless she was having systemic symptoms of fever and chills. Urine did appear infected/contaminated today but patient did not in symptoms therefore we will not send any culture for evaluation. We will have her follow-up in 4 months. If she does have symptoms of UTI she will need a cath urine specimen.     - estradiol (ESTRACE VAGINAL) 0.1 MG/GM vaginal cream; Place 1 g vaginally three times a week  Dispense: 1 each; Refill: 3      Orders Placed This Encounter   Procedures    POCT Urinalysis Dipstick w/ Micro (Auto)    LA Measure, post-void residual, US, non-imaging        Return in about 4 months (around 3/11/2022). All information inputted into the note by the MA to include chief complaint, past medical history, past surgical history, medications, allergies, social and family history and review of systems has been reviewed and updated as needed by me. EMR Dragon/transcription disclaimer: Much of this documentt is electronic  transcription/translation of spoken language to printed text. The  electronic translation of spoken language may be erroneous, or at times,  nonsensical words or phrases may be inadvertently transcribed.  Although I  have reviewed the document for such errors, some may still exist. 86

## 2022-06-25 NOTE — ED ADULT NURSE NOTE - CHIEF COMPLAINT
The patient is a 42y Male complaining of overdose. To ensure pt has access to food products and community resources for transition from hospital to community

## 2022-09-09 ENCOUNTER — EMERGENCY (EMERGENCY)
Facility: HOSPITAL | Age: 47
LOS: 1 days | End: 2022-09-09
Attending: STUDENT IN AN ORGANIZED HEALTH CARE EDUCATION/TRAINING PROGRAM
Payer: MEDICAID

## 2022-09-09 VITALS — HEIGHT: 68 IN

## 2022-09-09 PROBLEM — B19.20 UNSPECIFIED VIRAL HEPATITIS C WITHOUT HEPATIC COMA: Chronic | Status: ACTIVE | Noted: 2020-09-05

## 2022-09-09 PROBLEM — F14.10 COCAINE ABUSE, UNCOMPLICATED: Chronic | Status: ACTIVE | Noted: 2020-09-05

## 2022-09-09 PROBLEM — F19.90 OTHER PSYCHOACTIVE SUBSTANCE USE, UNSPECIFIED, UNCOMPLICATED: Chronic | Status: ACTIVE | Noted: 2020-09-05

## 2022-09-09 PROBLEM — M54.9 DORSALGIA, UNSPECIFIED: Chronic | Status: ACTIVE | Noted: 2020-09-05

## 2022-09-09 PROBLEM — M25.569 PAIN IN UNSPECIFIED KNEE: Chronic | Status: ACTIVE | Noted: 2020-09-05

## 2022-09-09 LAB
FLUAV AG NPH QL: SIGNIFICANT CHANGE UP
FLUBV AG NPH QL: SIGNIFICANT CHANGE UP
RSV RNA NPH QL NAA+NON-PROBE: SIGNIFICANT CHANGE UP
SARS-COV-2 RNA SPEC QL NAA+PROBE: SIGNIFICANT CHANGE UP

## 2022-09-09 PROCEDURE — 99285 EMERGENCY DEPT VISIT HI MDM: CPT | Mod: 25

## 2022-09-09 PROCEDURE — 87637 SARSCOV2&INF A&B&RSV AMP PRB: CPT

## 2022-09-09 PROCEDURE — 92950 HEART/LUNG RESUSCITATION CPR: CPT

## 2022-09-09 PROCEDURE — 36556 INSERT NON-TUNNEL CV CATH: CPT

## 2022-09-09 PROCEDURE — 99285 EMERGENCY DEPT VISIT HI MDM: CPT

## 2022-09-09 PROCEDURE — 99283 EMERGENCY DEPT VISIT LOW MDM: CPT

## 2022-09-09 PROCEDURE — C1751: CPT

## 2022-09-09 NOTE — ED PROVIDER NOTE - CLINICAL SUMMARY MEDICAL DECISION MAKING FREE TEXT BOX
47y Male BIBEMS unwitnessed arrest with initial rhythm asystole, multiple rounds of epi with 1mg NARCAN prior to ED arrival. Resuscitation continued without successful ROSC. Time of death 11:29. ME call placed.

## 2022-09-09 NOTE — ED PROVIDER NOTE - ATTENDING CONTRIBUTION TO CARE
CARLOS Horn: see mdm    I have personally performed a face to face diagnostic evaluation on this patient.  I have reviewed the resident's note and agree with the history, exam, and plan of care, except as noted.   My medical decision making and observations are found above.

## 2022-09-09 NOTE — ED PROVIDER NOTE - PROGRESS NOTE DETAILS
CARLOS Horn: d/w ME office staff Lucinda Harris, patient spouse states that patient used cocaine last night and heroin sometime this morning. -Светлана, DO

## 2022-09-09 NOTE — ED PROVIDER NOTE - OBJECTIVE STATEMENT
BIBEMS unwitnessed arrest, initial rhythm asystole, placed on denise, now s/p ~40 minutes cpr, 5 rounds of epi. Found with drug paraphernalia, given narcan 1mg without response en route, intubated with 7.5mm. 47y Male BIBEMS unwitnessed arrest, initial rhythm asystole, placed on denise, now s/p ~40 minutes cpr, 5 rounds of epi. Found with drug paraphernalia, given narcan 1mg without response en route, intubated with 7.5mm.

## 2022-09-09 NOTE — ED ADULT TRIAGE NOTE - CHIEF COMPLAINT QUOTE
Pt BIBA s/p cardiac arrest.  Unknown downtime, last seen by wife at approx 930.  As per EMS, drug paraphernalia around pt.  CPR in progress x 40 minutes pta; 4 epi admin, 1 bicarb, and narcan admin by ems. Intubated by EMS 7.5 and 25@lip.

## 2022-09-09 NOTE — ED PROVIDER NOTE - NSICDXPASTMEDICALHX_GEN_ALL_CORE_FT
PAST MEDICAL HISTORY:  Back pain     Cocaine abuse inhaled    Hepatitis C     IVDU (intravenous drug user) heroin    Knee pain

## 2022-09-09 NOTE — ED PROCEDURE NOTE - CPROC ED INFUS LINE DETAIL1
The catheter was placed using sterile technique./The guidewire was recovered./All lumen(s) aspirated and flushed without difficulty.

## 2022-09-09 NOTE — ED PROVIDER NOTE - PHYSICAL EXAMINATION
Detail Level: Zone Constitutional: Patient unresponsive, no purposeful movements. CPR in progress.  HENT: NC/AT. BVM/intubated  Eyes: Pupils fixed and dilated.  Neck: Trachea midline.  Cardiac: No cardiac heart tones. No palpable pulse. CPR in progress.  Resp: BVM respirations, intubated with ETT. No spontaneous respirations.  Abd: soft, non-distended.  Extremities: Cool and mottled.  Skin: Pale, cool to touch.  Neuro: Patient unresponsive, no spontaneous movements. Does not withdraw to pain. No gag reflex. No corneal reflex.

## 2022-09-09 NOTE — ED ADULT NURSE NOTE - OBJECTIVE STATEMENT
Patient received in Critical care unresponsive. Code blue called prior to Arrival. see code blue sheet for further documentation.

## 2022-10-04 NOTE — ED ADULT NURSE NOTE - BREATHING
Dolor dental    Dental Pain       El dolor dental es a menudo un signo de que sucede algo en los dientes o las encías. También es algo que puede ocurrir después de un tratamiento dental. Si tiene dolor dental, es importante que se ponga en contacto con amezcua dentista, especialmente si no se ha determinado la causa del dolor. La intensidad del dolor dental puede variar y maryse causas pueden ser muchas, entre ellas, las siguientes:  •Caries. Las caries son causadas por bacterias que producen ácidos que irritan el nervio del diente, volviéndolo sensible al aire y a las temperaturas altas o bajas. [place] con el tiempo provoca molestias o dolor.      •Infección o absceso. Celeste vez que las bacterias llegan a la parte interna del diente (pulpa), puede producirse celeste infección bacteriana (absceso dental). El pus suele acumularse en el extremo de la raíz de un diente.      •Lesiones.      •Ecleste grieta en el diente.      •Retracción de las encías que expone la raíz y posiblemente los nervios de un diente.      •Enfermedad en las encías (periodontal).      •Apretar o rechinar los dientes de forma anormal.      •Cuidado deficiente o inadecuado en el hogar.      •Celeste razón desconocida (idiopática).      El dolor puede ser leve o intenso. Puede ocurrir cuando usted:  •[place].      •Está expuesto a temperaturas calientes o frías.      •Come alimentos o tesfaye bebidas con azúcar, por ejemplo, gaseosas o caramelos.      El dolor puede ser shruthi, o puede aparecer y desaparecer sin ninguna causa.      Siga estas instrucciones en amezcua casa:    Las siguientes medidas pueden servir para aliviar cualquier molestia que esté sintiendo antes o después de recibir atención dental.    Medicamentos     •[place] los medicamentos de venta isaiah y los recetados solamente grisel se lo haya indicado el dentista.      •Si le recetaron un antibiótico, tómelo grisel se lo haya indicado el dentista. No deje de tesfaye los antibióticos aunque comience a sentirse mejor.      Comida y bebida     Evite los alimentos o las bebidas que le causen dolor, grisel los siguientes:  •Alimentos o bebidas muy calientes o muy fríos.      •Alimentos o bebidas dulces o con azúcar.        Control del dolor y la hinchazón    •El hielo a veces se puede usar para reducir el dolor y la hinchazón, especialmente si el dolor aparece después de un tratamiento dental.    •Si se lo indican, aplique hielo sobre la jose dolorida de la tori. Para hacer esto:  •Ponga el hielo en celeste bolsa plástica.      •Coloque celeste toalla entre la piel y la bolsa.      •Aplique el hielo alessia 20 minutos, 2 o 3 veces por día.      •Retire el hielo si la piel se pone de color [place] brillante. [place] es muy importante. Si no puede sentir dolor, calor o frío, tiene un mayor riesgo de que se dañe la jose.        Cepillarse los dientes    •Para mantener la boca y las encías sanas, cepíllese los dientes dos veces por día con celeste pasta dental con flúor.      •Use celeste pasta dental para dientes sensibles grisel se lo haya indicado el dentista, especialmente si la raíz está expuesta.      •Cepíllese siempre los dientes con un cepillo de cerdas suaves. [place] ayudará a evitar la irritación de las encías.      Instrucciones generales    •Use hilo dental al menos celeste vez al día.      •No se aplique calor en la parte externa de la tori.      •Benji gárgaras con celeste mezcla de agua y sal 3 o 4 veces al día, o según sea necesario. Para preparar agua con sal, disuelva totalmente de ½ a 1 cucharadita (de 3 a 6 g) de sal en 1 taza (237 ml) de agua tibia.      •Concurra a todas las visitas de seguimiento. [place] es importante.        Comuníquese con un dentista si:    •Padece algún dolor dental inexplicable.      •El dolor no se estela con los medicamentos.      •Maryse síntomas empeoran.      •Aparecen nuevos síntomas.        Solicite ayuda de inmediato si:    •No puede abrir la boca.      •Tiene problemas para respirar o tragar.      •Tiene fiebre.      •Observa que tiene hinchazón en la tori, el south o la mandíbula.      Estos síntomas pueden representar un problema grave que constituye celeste emergencia. No espere a bruno si los síntomas desaparecen. Solicite atención médica de inmediato. Comuníquese con el servicio de emergencias de amezcua localidad (911 en los [place]). No conduzca por maryse propios medios hasta el hospital.       Resumen    •Las causas del dolor dental pueden ser muchas, entre ellas, celeste caries y celeste infección.      •El dolor puede ser leve o intenso.      •[place] los medicamentos de venta isaiah y los recetados solamente grisel se lo haya indicado el dentista.      •Controle el dolor dental a fin de detectar algún cambio. Informe a amezcua dentista si los síntomas empeoran. spontaneous/unlabored

## 2022-10-05 NOTE — ED PROVIDER NOTE - MUSCULOSKELETAL, MLM
Per Mandy Mccray needs a new ultrasound order ands states she cannot use the one from Dr. Cristal Mcbride since he has retired.  Please advise Spine appears normal, range of motion is not limited, no muscle or joint tenderness

## 2023-10-02 NOTE — ED ADULT NURSE REASSESSMENT NOTE - NEURO WDL
Alert and oriented to person, place and time, memory intact, behavior appropriate to situation, PERRL.
81

## 2025-07-28 NOTE — ED ADULT NURSE NOTE - CAS DISCH CONDITION
up in 1 week.         Treatment Note please see attached Discharge Instructions    Written patient dismissal instructions given to patient and signed by patient or POA.         Discharge Instructions         [facility]   Physician Orders and Discharge Instructions  [id number] [place]  [address]  Telephone: [phone]      [phone]      NAME:  Shade Chowdary          YOB: 1969  MEDICAL RECORD NUMBER:  [id number]    Your  is:  Melania    Home Care/Facility: None    Wound Location: Left Hand    Dressing orders: Steri-strips and dry dressing applied today.  If the Steri-strips fall off and apply a dry dressing - call and Sandra will call you in an Antibiotic ointment    Compression: None    Offloading Device:    Other Instructions: Try to keep the wound area dry.    Keep all dressings clean, dry and intact.  Keep pressure off the wound(s) at all times.     Follow up visit   1 Week August 4, 2025 @ 1:00    Please give 24 hour notice if unable to keep appointment. [phone]    If you experience any of the following, please call the Wound Care Service at  [phone] or go to the nearest emergency room.   *Increase in pain *Temperature over 101 *Increase in drainage from your wound or a foul odor  *Uncontrolled swelling *Need for compression bandage changes due to slippage, breakthrough drainage       PLEASE NOTE: IF YOU ARE UNABLE TO OBTAIN WOUND SUPPLIES, CONTINUE TO USE THE SUPPLIES YOU HAVE AVAILABLE UNTIL YOU ARE ABLE TO REACH US. IT IS MOST IMPORTANT TO KEEP THE WOUND COVERED AT ALL TIMES          Electronically signed by MINDI Martinez CNP on 7/28/2025 at 1:33 PM          Electronically signed by MINDI Martinez CNP on 7/28/2025 at 1:33 PM       Improved